# Patient Record
Sex: MALE | Race: WHITE | HISPANIC OR LATINO | URBAN - METROPOLITAN AREA
[De-identification: names, ages, dates, MRNs, and addresses within clinical notes are randomized per-mention and may not be internally consistent; named-entity substitution may affect disease eponyms.]

---

## 2022-02-17 ENCOUNTER — EMERGENCY (EMERGENCY)
Facility: HOSPITAL | Age: 47
LOS: 1 days | Discharge: HOME | End: 2022-02-17
Attending: EMERGENCY MEDICINE
Payer: SELF-PAY

## 2022-02-17 VITALS
WEIGHT: 160.06 LBS | OXYGEN SATURATION: 98 % | RESPIRATION RATE: 18 BRPM | HEART RATE: 78 BPM | DIASTOLIC BLOOD PRESSURE: 68 MMHG | HEIGHT: 65 IN | TEMPERATURE: 98 F | SYSTOLIC BLOOD PRESSURE: 143 MMHG

## 2022-02-17 VITALS
SYSTOLIC BLOOD PRESSURE: 126 MMHG | RESPIRATION RATE: 18 BRPM | TEMPERATURE: 98 F | HEART RATE: 72 BPM | OXYGEN SATURATION: 98 % | DIASTOLIC BLOOD PRESSURE: 81 MMHG

## 2022-02-17 LAB
ALBUMIN SERPL ELPH-MCNC: 4.6 G/DL — SIGNIFICANT CHANGE UP (ref 3.3–5.2)
ALP SERPL-CCNC: 106 U/L — SIGNIFICANT CHANGE UP (ref 40–120)
ALT FLD-CCNC: 155 U/L — HIGH
ANION GAP SERPL CALC-SCNC: 19 MMOL/L — HIGH (ref 5–17)
AST SERPL-CCNC: 180 U/L — HIGH
BASOPHILS # BLD AUTO: 0.05 K/UL — SIGNIFICANT CHANGE UP (ref 0–0.2)
BASOPHILS NFR BLD AUTO: 0.6 % — SIGNIFICANT CHANGE UP (ref 0–2)
BILIRUB SERPL-MCNC: 0.6 MG/DL — SIGNIFICANT CHANGE UP (ref 0.4–2)
BUN SERPL-MCNC: 8.3 MG/DL — SIGNIFICANT CHANGE UP (ref 8–20)
CALCIUM SERPL-MCNC: 9.3 MG/DL — SIGNIFICANT CHANGE UP (ref 8.6–10.2)
CHLORIDE SERPL-SCNC: 105 MMOL/L — SIGNIFICANT CHANGE UP (ref 98–107)
CO2 SERPL-SCNC: 21 MMOL/L — LOW (ref 22–29)
CREAT SERPL-MCNC: 0.84 MG/DL — SIGNIFICANT CHANGE UP (ref 0.5–1.3)
EOSINOPHIL # BLD AUTO: 0.12 K/UL — SIGNIFICANT CHANGE UP (ref 0–0.5)
EOSINOPHIL NFR BLD AUTO: 1.4 % — SIGNIFICANT CHANGE UP (ref 0–6)
GLUCOSE SERPL-MCNC: 116 MG/DL — HIGH (ref 70–99)
HCT VFR BLD CALC: 39.3 % — SIGNIFICANT CHANGE UP (ref 39–50)
HGB BLD-MCNC: 13.2 G/DL — SIGNIFICANT CHANGE UP (ref 13–17)
IMM GRANULOCYTES NFR BLD AUTO: 0.1 % — SIGNIFICANT CHANGE UP (ref 0–1.5)
LYMPHOCYTES # BLD AUTO: 3.23 K/UL — SIGNIFICANT CHANGE UP (ref 1–3.3)
LYMPHOCYTES # BLD AUTO: 38.1 % — SIGNIFICANT CHANGE UP (ref 13–44)
MCHC RBC-ENTMCNC: 28.6 PG — SIGNIFICANT CHANGE UP (ref 27–34)
MCHC RBC-ENTMCNC: 33.6 GM/DL — SIGNIFICANT CHANGE UP (ref 32–36)
MCV RBC AUTO: 85.1 FL — SIGNIFICANT CHANGE UP (ref 80–100)
MONOCYTES # BLD AUTO: 0.48 K/UL — SIGNIFICANT CHANGE UP (ref 0–0.9)
MONOCYTES NFR BLD AUTO: 5.7 % — SIGNIFICANT CHANGE UP (ref 2–14)
NEUTROPHILS # BLD AUTO: 4.58 K/UL — SIGNIFICANT CHANGE UP (ref 1.8–7.4)
NEUTROPHILS NFR BLD AUTO: 54.1 % — SIGNIFICANT CHANGE UP (ref 43–77)
PLATELET # BLD AUTO: 228 K/UL — SIGNIFICANT CHANGE UP (ref 150–400)
POTASSIUM SERPL-MCNC: 3.7 MMOL/L — SIGNIFICANT CHANGE UP (ref 3.5–5.3)
POTASSIUM SERPL-SCNC: 3.7 MMOL/L — SIGNIFICANT CHANGE UP (ref 3.5–5.3)
PROT SERPL-MCNC: 8.3 G/DL — SIGNIFICANT CHANGE UP (ref 6.6–8.7)
RBC # BLD: 4.62 M/UL — SIGNIFICANT CHANGE UP (ref 4.2–5.8)
RBC # FLD: 15.3 % — HIGH (ref 10.3–14.5)
SODIUM SERPL-SCNC: 144 MMOL/L — SIGNIFICANT CHANGE UP (ref 135–145)
TROPONIN T SERPL-MCNC: <0.01 NG/ML — SIGNIFICANT CHANGE UP (ref 0–0.06)
WBC # BLD: 8.47 K/UL — SIGNIFICANT CHANGE UP (ref 3.8–10.5)
WBC # FLD AUTO: 8.47 K/UL — SIGNIFICANT CHANGE UP (ref 3.8–10.5)

## 2022-02-17 PROCEDURE — 93010 ELECTROCARDIOGRAM REPORT: CPT

## 2022-02-17 PROCEDURE — 99285 EMERGENCY DEPT VISIT HI MDM: CPT | Mod: 25

## 2022-02-17 PROCEDURE — 71045 X-RAY EXAM CHEST 1 VIEW: CPT | Mod: 26

## 2022-02-17 PROCEDURE — 36415 COLL VENOUS BLD VENIPUNCTURE: CPT

## 2022-02-17 PROCEDURE — 99285 EMERGENCY DEPT VISIT HI MDM: CPT

## 2022-02-17 PROCEDURE — T1013: CPT

## 2022-02-17 PROCEDURE — 85025 COMPLETE CBC W/AUTO DIFF WBC: CPT

## 2022-02-17 PROCEDURE — 93005 ELECTROCARDIOGRAM TRACING: CPT

## 2022-02-17 PROCEDURE — 80053 COMPREHEN METABOLIC PANEL: CPT

## 2022-02-17 PROCEDURE — 71045 X-RAY EXAM CHEST 1 VIEW: CPT

## 2022-02-17 PROCEDURE — 84484 ASSAY OF TROPONIN QUANT: CPT

## 2022-02-17 RX ORDER — ASPIRIN/CALCIUM CARB/MAGNESIUM 324 MG
162 TABLET ORAL ONCE
Refills: 0 | Status: COMPLETED | OUTPATIENT
Start: 2022-02-17 | End: 2022-02-17

## 2022-02-17 RX ADMIN — Medication 162 MILLIGRAM(S): at 22:54

## 2022-02-17 NOTE — ED PROVIDER NOTE - NSFOLLOWUPCLINICS_GEN_ALL_ED_FT
[facility]  Cardiology  [address] [place] [address]  Phone: [phone]  Fax:

## 2022-02-17 NOTE — ED ADULT TRIAGE NOTE - CHIEF COMPLAINT QUOTE
patient states that he does not feel good for 3 days, complaining of chest pain when taking deep breath, feeling tired to the point of almost fainting sore throat

## 2022-02-17 NOTE — ED PROVIDER NOTE - ATTENDING CONTRIBUTION TO CARE
Millicent: I performed a face to face bedside interview with patient regarding history of present illness, review of symptoms and past medical history. I completed an independent physical exam.  I have discussed patient's plan of care with resident.   I agree with note as stated above including HISTORY OF PRESENT ILLNESS, HIV, PAST MEDICAL/SURGICAL/FAMILY/SOCIAL HISTORY, ALLERGIES AND HOME MEDICATIONS, REVIEW OF SYSTEMS, PHYSICAL EXAM, MEDICAL DECISION MAKING and any PROGRESS NOTES during the time I functioned as the attending physician for this patient unless otherwise noted. My brief assessment is as follows: 45yo male with history of HTN, DM presenting with intermittent chest pain x 5 months with more persistent pain for the past 5 days. Pain is substernal, pulsating, 7/10 at its worse with radiation to both shoulders and associated shortness of breath. Asymptomatic now. RRR, lungs clear b/l, no LE edema. ECG shows T-wave inversion V3-4. Labs, troponin, cxr, ASA, reassess.

## 2022-02-17 NOTE — ED PROVIDER NOTE - PATIENT PORTAL LINK FT
You can access the FollowMyHealth Patient Portal offered by [facility] by registering at the following website: [email or web address]. By joining "[employer]’s FollowMyHealth portal, you will also be able to view your health information using other applications (apps) compatible with our system.

## 2022-02-17 NOTE — ED PROVIDER NOTE - NSFOLLOWUPINSTRUCTIONS_ED_ALL_ED_FT
Chest Pain    Chest pain can be caused by many different conditions which may or may not be dangerous. Causes include heartburn, lung infections, heart attack, blood clot in lungs, skin infections, strain or damage to muscle, cartilage, or bones, etc. In addition to a history and physical examination, an electrocardiogram (ECG) or other lab tests may have been performed to determine the cause of your chest pain. Follow up with your primary care provider or with a cardiologist as instructed.     SEEK IMMEDIATE MEDICAL CARE IF YOU HAVE ANY OF THE FOLLOWING SYMPTOMS: worsening chest pain, coughing up blood, unexplained back/neck/jaw pain, severe abdominal pain, dizziness or lightheadedness, fainting, shortness of breath, sweaty or clammy skin, vomiting, or racing heart beat. These symptoms may represent a serious problem that is an emergency. Do not wait to see if the symptoms will go away. Get medical help right away. Call 911 and do not drive yourself to the hospital.      LO QUE NECESITA SABER:    ¿Qué es la angina de pecho?La angina de pecho es un dolor, presión o sensación de opresión que por lo general se siente en el pecho. Puede aparecer dolor de pecho cuando está estresado o hace actividades físicas, scott caminar o ejercitar. La angina se debe a vinay disminución en la cantidad de rd y oxígeno que llegan al corazón. Estos a menudo son causados por ateroesclerosis (endurecimiento de las arterias). La angina de pecho podría empeorar, pulido riesgo de padecer un ataque cardíaco puede aumentar o pulido nick puede correr peligro.    ¿Qué aumenta mi riesgo de tener angina?  •Ser mayor de 55 años      •Ser hombre      •Ser vinay dipti que fuma, yimi píldoras para evitar embarazo o está atravesando la menopausia      •Diabetes, hipertensión, enfermedad renal crónica o colesterol alto      •Un problema cardíaco, scott vinay enfermedad de las válvulas cardíacas, un ataque cardíaco pasado o agrandamiento del corazón      •Vinay condición que causa inflamación, scott la artritis reumatoide (AR)      •Antecedentes de tabaquismo, respirar humo de segunda mano o usar cocaína      •No hacer suficiente ejercicio o tener exceso de peso      •Un familiar diagnosticado con enfermedad cardíaca a temprana edad      ¿Qué otros signos y síntomas de angina podría tener?  •Presión, opresión o dolor en pulido shana, mandíbula, hombros o espalda      •Dolor o adormecimiento en cualquiera de los brazos      •Sensación de malestar similar a la acidez estomacal      •Falta de aliento, sudoración, náuseas o desvanecimiento      ¿Cómo se diagnostica la angina?  •Un electrocardiogramagraba pulido ritmo cardíaco y la rapidez con la que late pulido corazón. También se usa para comprobar si hay algún problema o lesiones en las distintas partes del corazón.      •Los análisis de sangrepodrían indicar si pulido corazón está dañado. El médico también podría obtener información sobre pulido zeinab en general de los exámenes de rd.      •Vinay prueba de esfuerzoayuda a los médicos a ashleigh los cambios que tienen lugar en pulido corazón cuando está bajo presión. Para lograrlo, es posible que los médicos usen el ejercicio o medicamentos. Consulte con pulido médico para más información sobre gayatri examen.      •Un ecocardiogramaes un tipo de ultrasonido. Las ondas sonoras se utilizan para mostrar la estructura, movimiento y los vasos sanguíneos de pulido corazón.      •Cateterismo cardíacoes un procedimiento que utiliza un medio de contraste y radiografías para comprobar cómo circula la rd por suzanne arterias coronarias. Podría ayudar a pulido médico a decidir qué tratamiento seguir para pulido angina. A veces los bloqueos se pueden tratar angeles vinay cateterización cardíaca.      ¿Cómo se trata la angina?  •Los medicamentospodrían ser administrados para prevenir coágulos de rd. Usted puede sangrar o tener moretones más fácilmente mientras está tomando gayatri medicamento. Otros medicamentos pueden abrir las arterias o disminuir los latidos del corazón. Puede que también necesite medicamentos para bajar pulido presión arterial o los niveles de colesterol. No tome ciertos medicamentos sin antes preguntarle a pulido médico. Estos incluyen KIMBER, suplementos vitamínicos o a base de hierbas u hormonas (estrógeno o progestágeno).      •Vinay angioplastia y un stent vascularayudan a abrir las arterias coronarias y permiten que la rd fluya al corazón. Pida más información sobre estos procedimientos.      •La cirugía de injertos de revascularización coronaria (RVC)o vinay cirugía a corazón abierto pueden mejorar el flujo sanguíneo al corazón. [place] aliviará el dolor que siente en el pecho y evitará que tenga un ataque cardíaco.      ¿Qué puedo hacer para controlar la angina de pecho?  •Mantenga un registro o un calendario con los detalles sobre pulido dolor de pecho.Cada vez que sienta dolor o tenga síntomas, describa el dolor, pulido duración y gravedad. También registre lo que estaba haciendo cuando comenzó el dolor y qué lo hace desaparecer. Lleve suzanne notas a todas suzanne citas con pulido médico.      •Evite las actividades que causan angina.Preste atención a suzanne síntomas y averigüe qué es lo que empeora pulido angina.      •Mantenga bajo control las afecciones que pueden causar la angina de pecho.Los médicos pueden ayudarle a controlar vinay condición médica crónica, scott la diabetes. Consulte con pulido médico para obtener más información.      •Pregunte sobre las vacunas que pudiera necesitar.Las vacunas ayudan a prevenir las infecciones que pueden ejercer más presión sobre el corazón. Vacúnese contra la influenza (gripe) todos los años tan pronto scott se recomiende, normalmente en septiembre u octubre. Usted también podría necesitar la vacuna antineumocócica para evitar la neumonía. La vacuna se administra generalmente cada 5 años, a partir de los 65 años. Pulido médico puede indicarle si debe recibir otras vacunas, y cuándo aplicárselas.      ¿Cuáles son otros consejos para tener un corazón saludable?  •No fume.La nicotina y otros químicos en los cigarrillos y cigarros pueden provocar daño al corazón y al pulmón. Pida información a pulido médico si usted actualmente fuma y necesita ayuda para dejar de fumar. Los cigarrillos electrónicos o el tabaco sin humo igualmente contienen nicotina. Consulte con pulido médico antes de utilizar estos productos.      •Mantenga un peso saludable.Si usted pesa más de lo que se considera saludable para usted, pulido corazón deberá hacer un mayor esfuerzo. Usted corre un mayor riesgo de tener problemas serios de zeinab si tiene sobrepeso. Pregúntele a pulido médico cuál es el peso ideal para usted. Pídale que lo ayude a crear un plan para bajar de peso si tiene sobrepeso.      •Pregunte acerca de la actividad física.La actividad física, scott el ejercicio, puede ayudar a fortalecer el corazón. Pulido médico puede ayudarle a crear un plan de actividad física que sea seguro.      •Elija vinay variedad de alimentos saludables para el corazón tan a menudo scott sea posible.Incluya frutas y verduras frescas, congeladas o enlatadas. También incluya productos lácteos bajos en grasa, pescado, ana (sin piel) y matt magras. Pulido médico o dietista pueden ayudarlo a crear planes de alimentos.             •Reduzca el consumo de sodio (sal).Limite el consumo de alimentos altos en sodio, scott comidas enlatadas, bocadillos salados y embutidos. Si añade jasen cuando cocina la comida, no añada más en la agarwal. Elija alimentos enlatados bajos en sodio tanto scott sea posible.             •Consuma alimentos altos en ácidos grasos omega-3.Los pescados ricos en grasas omega-3 incluyen salmón, atún y arenque. Otras gatica incluyen el brócoli, las nueces y los huevos.  Gatica de [place] 3           Llame al número local de emergencias (911 en los [place]) o pídale a alguien que llame si:  •Tiene alguno de los siguientes signos de un ataque cardíaco: ?Estrujamiento, presión o tensión en pulido pecho      ?Usted también podría presentar alguno de los siguientes: ?Malestar o dolor en pulido espalda, shana, mandíbula, abdomen, o brazo      ?Falta de aliento      ?Náuseas o vómitos      ?Desvanecimiento o sudor frío repentino        •Usted tiene dolor en el pecho que no desaparece después de artem medicamentos scott se le indicó.      •Usted pierde la sensación en pulido dunia, brazos o piernas, o repentinamente se siente débil.      ¿Cuándo morales buscar atención inmediata?  •La angina de pecho se presenta más a menudo, dura más tiempo o el dolor es peor.          ¿Cuándo morales llamar a mi médico?  •Usted se siente mareado o tiene náuseas después de artem pulido medicamento.      •Le falta el aliento en reposo.      •Usted tiene inflamación nueva o creciente en suzanne pies o tobillos.      •Usted tiene preguntas o inquietudes acerca de pulido condición o cuidado.      ACUERDOS SOBRE PULIDO CUIDADO:    Usted tiene el derecho de ayudar a planear pulido cuidado. Aprenda todo lo que pueda sobre pulido condición y scott darle tratamiento. Discuta suzanne opciones de tratamiento con suzanne médicos para decidir el cuidado que usted desea recibir. Usted siempre tiene el derecho de rechazar el tratamiento.

## 2022-02-17 NOTE — ED PROVIDER NOTE - CLINICAL SUMMARY MEDICAL DECISION MAKING FREE TEXT BOX
45yo male with history of HTN, DM presenting with intermittent chest pain x 5 months with more persistent pain for the past 5 days. Pain is substernal, pulsating, 7/10 at its worse with radiation to both shoulders and associated shortness of breath. Asymptomatic now with last episode started one hour ago. RRR, lungs clear b/l, no LE edema. ECG shows T-wave inversion V3-4. Labs, troponin, cxr, ASA, reassess. 47yo male with history of HTN, DM presenting with intermittent chest pain x 5 months with more persistent pain for the past 5 days. Pain is substernal, pulsating, 7/10 at its worse with radiation to both shoulders and associated shortness of breath. Asymptomatic now. RRR, lungs clear b/l, no LE edema. ECG shows T-wave inversion V3-4. Labs, troponin, cxr, ASA, reassess.

## 2022-02-17 NOTE — ED PROVIDER NOTE - IV ALTEPLASE INCLUSION HIDDEN
For the safety of our patients, [facility] is cancelling all non emergent procedures through April 10th at this time.  Patient's colonoscopy scheduled 4/9/20 with Dr. Hidalgo has been cancelled.  We will call him to reschedule scope when schedules are re-opened.   
.Pt is made aware of below information. Verbalized understanding and in agreement with plan.     
show

## 2022-02-17 NOTE — ED PROVIDER NOTE - PHYSICAL EXAMINATION
General: Well appearing in no acute distress. Alert and cooperative.   Head: Normocephalic, atraumatic.  Eyes: PERRLA. No conjunctival injection. No scleral icterus. EOMI  ENMT: Atraumatic external nose and ears.   Neck: Soft and supple.   Cardiac: Regular rate and regular rhythm. No murmurs. No LE edema.  Resp: Unlabored respiratory effort. Lungs CTAB. Speaking in full sentences. No wheezes.  Abd: Soft, non-tender, non-distended.   MSK: Spine midline and non-tender.   Skin: Warm and dry.   Neuro: AO x 3. Moves all extremities symmetrically. Motor strength and sensation grossly intact.

## 2022-02-17 NOTE — ED PROVIDER NOTE - PROGRESS NOTE DETAILS
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 troponin negative. on reassessment patient is asymptomatic. patient states he will follow up with cardiology outpatient with verbal understanding for strict return precautions. -DO Micky

## 2022-02-17 NOTE — ED PROVIDER NOTE - OBJECTIVE STATEMENT
47yo male with history of HTN, DM not on medications presenting with intermittent chest pain x 5 months with worsening pain the past 5 days which now more constant with cough, fever, nausea. Reports pain is substernal, pulsating, 7/10 at its worse with radiation to both shoulders and associated shortness of breath. Patient states his last episode started one hour ago but now asymptomatic. He is unsure how long pain lasts. Denies hematuria, dysuria, dark stools, focal neurologic symptoms. 47yo male with history of HTN, DM not on medications presenting with intermittent chest pain x 5 months with worsening pain the past 5 days which now more constant with cough, subjective fever, nausea. Reports pain is substernal, pulsating, 7/10 at its worse with radiation to both shoulders and associated shortness of breath. Patient states his CP is constant today but now he is asymptomatic. He is unsure how long pain lasts. Denies hematuria, dysuria, dark stools, focal neurologic symptoms.

## 2022-02-18 NOTE — ED ADULT NURSE NOTE - OBJECTIVE STATEMENT
used for assessment Pt AOx4 in no acute distress. Pt complaining of chest pain for the last 5 months that has gotten worse this week. Pt states it substernal chest pain 7/10 along with SOB. Pt placed on CM VSS at this time, Pt labs sent as per orders, meds given as per orders pt educated on plan of care, pt able to successfully teach back plan of care to RN, RN will continue to reeducate pt during hospital stay.

## 2022-04-05 NOTE — ED ADULT NURSE NOTE - NSHOSCREENINGQ1_ED_ALL_ED
Pt still diuresing at this time. Pt w/PT/OT recs for SNF; Pt currently on waiting list for the [place] [id number]. Referral made for SNF. CM will continue to follow and will assist w/precert once approved.   Cathy Mart RN No
